# Patient Record
Sex: MALE | Race: WHITE | NOT HISPANIC OR LATINO | URBAN - METROPOLITAN AREA
[De-identification: names, ages, dates, MRNs, and addresses within clinical notes are randomized per-mention and may not be internally consistent; named-entity substitution may affect disease eponyms.]

---

## 2017-05-31 PROCEDURE — 87086 URINE CULTURE/COLONY COUNT: CPT | Performed by: INTERNAL MEDICINE

## 2017-05-31 PROCEDURE — 87491 CHLMYD TRACH DNA AMP PROBE: CPT | Performed by: INTERNAL MEDICINE

## 2017-05-31 PROCEDURE — 87591 N.GONORRHOEAE DNA AMP PROB: CPT | Performed by: INTERNAL MEDICINE

## 2017-10-09 PROBLEM — Z72.0 TOBACCO ABUSE: Status: ACTIVE | Noted: 2017-10-09

## 2021-04-26 ENCOUNTER — IMMUNIZATION (OUTPATIENT)
Dept: LAB | Age: 26
End: 2021-04-26

## 2021-04-26 DIAGNOSIS — Z23 NEED FOR VACCINATION: Primary | ICD-10-CM

## 2021-04-26 PROCEDURE — 0011A COVID-19 MODERNA VACCINE: CPT | Performed by: NURSE PRACTITIONER

## 2021-04-26 PROCEDURE — 91301 COVID-19 MODERNA VACCINE: CPT | Performed by: NURSE PRACTITIONER

## 2021-05-16 ENCOUNTER — HOSPITAL ENCOUNTER (EMERGENCY)
Facility: HOSPITAL | Age: 26
Discharge: HOME | End: 2021-05-16
Attending: EMERGENCY MEDICINE
Payer: COMMERCIAL

## 2021-05-16 VITALS
HEIGHT: 71 IN | WEIGHT: 130 LBS | RESPIRATION RATE: 18 BRPM | TEMPERATURE: 97 F | OXYGEN SATURATION: 100 % | DIASTOLIC BLOOD PRESSURE: 82 MMHG | SYSTOLIC BLOOD PRESSURE: 154 MMHG | BODY MASS INDEX: 18.2 KG/M2 | HEART RATE: 90 BPM

## 2021-05-16 DIAGNOSIS — T14.8XXA FOREIGN BODY IN SKIN: Primary | ICD-10-CM

## 2021-05-16 PROCEDURE — 10120 INC&RMVL FB SUBQ TISS SMPL: CPT

## 2021-05-16 PROCEDURE — 90471 IMMUNIZATION ADMIN: CPT

## 2021-05-16 PROCEDURE — 99283 EMERGENCY DEPT VISIT LOW MDM: CPT

## 2021-05-16 RX ORDER — CEFADROXIL 500 MG/1
500 CAPSULE ORAL 2 TIMES DAILY
Qty: 20 CAPSULE | Refills: 0 | Status: SHIPPED | OUTPATIENT
Start: 2021-05-16 | End: 2021-05-26

## 2021-05-17 NOTE — ED PROVIDER NOTES
Patient Seen in: [facility] Emergency Department      History   Patient presents with:   Other    Stated Complaint: fishing lure in arm     HPI/Subjective:   HPI    Patient is a 26-year-old right-hand-dominant male presents emergency room with a hist and equal in both upper extremities. There is a fishing lower stuck on the dorsal aspect of the patient's left wrist.  There is no evidence of any surrounding erythema or drainage appreciated.   NEURO: Patient is awake, alert and oriented to time place and

## 2021-05-24 ENCOUNTER — IMMUNIZATION (OUTPATIENT)
Dept: LAB | Age: 26
End: 2021-05-24
Attending: HOSPITALIST

## 2021-05-24 DIAGNOSIS — Z23 NEED FOR VACCINATION: Primary | ICD-10-CM

## 2021-05-24 PROCEDURE — 0012A COVID-19 MODERNA VACCINE: CPT | Performed by: HOSPITALIST

## 2021-05-24 PROCEDURE — 91301 COVID-19 MODERNA VACCINE: CPT | Performed by: HOSPITALIST

## 2021-08-10 PROBLEM — F32.2 MDD (MAJOR DEPRESSIVE DISORDER), SINGLE EPISODE, SEVERE (HCC): Status: ACTIVE | Noted: 2021-08-10

## 2021-08-10 NOTE — ED PROVIDER NOTES
Patient Seen in: [facility] Emergency Department      History   Patient presents with:  Eval-P    Stated Complaint: eval p etoh     HPI/Subjective:   HPI    73-year-old male presents for evaluation of alcohol intoxication and suicidal gesture.   Appa within normal limits   ETHYL ALCOHOL - Abnormal; Notable for the following components:    Ethyl Alcohol 290 (*)     All other components within normal limits   CBC WITH DIFFERENTIAL WITH PLATELET    Narrative:      The following orders were created for pane

## 2021-08-10 NOTE — ED INITIAL ASSESSMENT (HPI)
25YM c/c of etoh Pt state that he been drinking and been having SI thoughts.  Pt state that he has a plan but unwilling to disclose at this time

## 2021-08-10 NOTE — ED PROVIDER NOTES
The patient's care was endorsed to me by Dr. Shereen Lang at 5 AM.  Petition and certificate have been applied to the chart. Await [facility] hospital evaluation and placement. Requires inpatient care.

## 2021-08-10 NOTE — BH LEVEL OF CARE ASSESSMENT
Crisis Evaluation Assessment    Josie Wall YOB: 1995   Age 23year old MRN [id number]   Location [address] [facility] Attending Serg Wright MD      Patient's legal sex: male  Patient identifies as: male  Kayla Genna noose)  5a. Have you started to work out or worked out the details of how to kill yourself? (past 30 days): Yes  5b. Do you intend to carry out this plan? (past 30 days): Yes  6.  Have you ever done anything, started to do anything, or prepared to do anythi reports history of depression and anxiety symptoms since at least high school, and states that symptoms include feelings of hopelessness, worthlessness, and helplessness, isolation, and inability to control worry/racing thoughts.  Pt reports sleep patterns Abuse Assessment:  Abuse Assessment  Physical Abuse: Denies  Verbal Abuse: Denies  Sexual Abuse: Denies  Neglect: Denies  Does anyone say or do something to you that makes you feel unsaf increase in depression/anxiety symptoms and suicidal ideation. Allyson Lemus also reports daily alcohol use, which he reports has been an \"issue\" since high school.  Allyson Lemus states a plan and intention to harm self by hanging self with a cord, and was preparing to do

## 2021-08-10 NOTE — PROGRESS NOTES
08/10/21 0727   COVID Exposure Risk Screening   Have you been practicing social distancing? Yes   Have you been wearing a mask when in the community? Yes   Are the people you live with following social distancing and wearing a mask?  Yes  (Pt lives in th

## 2021-08-10 NOTE — ED NOTES
Spoke to [facility] to review social distance screener. Pt does not need any covid restrictions and can have a roommate at this time.

## 2021-08-10 NOTE — ED QUICK NOTES
pts belongings returned to RN, RN advised pt to have his parents take his 2 neckless's home, that he will not be able to have them at [facility]. Pt agreeable. Pts neckless's given to patient.      Pt and family updated with POC

## 2021-08-10 NOTE — ED NOTES
Additional collateral provided by pt's parents, both pt and parents present for collateral conversation. Pt's parents report that pt has struggled with depression in the past, but that this is the first time that pt has attempted to harm self.  Pt's moth

## 2021-08-10 NOTE — ED QUICK NOTES
RN went in to round on pt, pt upset because the phone isnt working, RN dialed number for pt. Pt declining any additional needs at this time.

## 2021-08-10 NOTE — ED QUICK NOTES
RN gave report to Cindy Palma at [facility]. Pt will be going to room 829B    Admitting MD: Dr. Opal Castellanos. [facility] rightnow.

## 2021-08-11 PROBLEM — F33.2 SEVERE RECURRENT MAJOR DEPRESSION WITHOUT PSYCHOTIC FEATURES (HCC): Status: ACTIVE | Noted: 2021-08-10

## 2023-01-15 ENCOUNTER — HOSPITAL ENCOUNTER (OUTPATIENT)
Age: 28
Discharge: HOME | End: 2023-01-15
Payer: COMMERCIAL

## 2023-01-15 VITALS
HEIGHT: 71 IN | BODY MASS INDEX: 27.3 KG/M2 | OXYGEN SATURATION: 98 % | HEART RATE: 94 BPM | RESPIRATION RATE: 20 BRPM | DIASTOLIC BLOOD PRESSURE: 78 MMHG | WEIGHT: 195 LBS | SYSTOLIC BLOOD PRESSURE: 137 MMHG | TEMPERATURE: 100 F

## 2023-01-15 DIAGNOSIS — J02.9 THROAT SORENESS: Primary | ICD-10-CM

## 2023-01-15 DIAGNOSIS — R68.89 FLU-LIKE SYMPTOMS: ICD-10-CM

## 2023-01-15 DIAGNOSIS — U07.1 LAB TEST POSITIVE FOR DETECTION OF COVID-19 VIRUS: ICD-10-CM

## 2023-01-15 LAB
POCT INFLUENZA A: NEGATIVE
POCT INFLUENZA B: NEGATIVE
S PYO AG THROAT QL: NEGATIVE
SARS-COV-2 RNA RESP QL NAA+PROBE: DETECTED

## 2023-01-15 PROCEDURE — 87502 INFLUENZA DNA AMP PROBE: CPT | Performed by: PHYSICIAN ASSISTANT

## 2023-01-15 PROCEDURE — 87880 STREP A ASSAY W/OPTIC: CPT

## 2023-01-15 PROCEDURE — 99212 OFFICE O/P EST SF 10 MIN: CPT

## 2023-01-15 PROCEDURE — 99214 OFFICE O/P EST MOD 30 MIN: CPT

## 2023-01-15 RX ORDER — GUAIFENESIN 600 MG/1
1200 TABLET, EXTENDED RELEASE ORAL 2 TIMES DAILY
COMMUNITY

## 2023-01-15 NOTE — DISCHARGE INSTRUCTIONS
Please return to the ER/clinic if symptoms worsen. Follow-up with your PCP in 24-48 hours as needed. Push fluids. Take an over-the-counter antihistamine daily i.e. Zyrtec. Recommend sleeping more upright and spray in the back of the throat with Cepacol spray. Take motrin and/or Tylenol for fever and pain. Please follow the COVID care instructions. CDC recommends 5 days quarantine from symptoms in 5 days heavy masking. If anything changes i.e. increasing fever shortness of breath go directly to the emergency room. Otherwise follow-up with your primary care physician for further evaluation and treatment.